# Patient Record
Sex: FEMALE | Race: ASIAN | NOT HISPANIC OR LATINO | Employment: FULL TIME | URBAN - METROPOLITAN AREA
[De-identification: names, ages, dates, MRNs, and addresses within clinical notes are randomized per-mention and may not be internally consistent; named-entity substitution may affect disease eponyms.]

---

## 2018-04-18 ENCOUNTER — OFFICE VISIT (OUTPATIENT)
Dept: URGENT CARE | Facility: URGENT CARE | Age: 23
End: 2018-04-18
Payer: COMMERCIAL

## 2018-04-18 VITALS
SYSTOLIC BLOOD PRESSURE: 106 MMHG | HEART RATE: 89 BPM | WEIGHT: 130.6 LBS | OXYGEN SATURATION: 99 % | TEMPERATURE: 98.1 F | BODY MASS INDEX: 21.9 KG/M2 | DIASTOLIC BLOOD PRESSURE: 58 MMHG

## 2018-04-18 DIAGNOSIS — N30.00 ACUTE CYSTITIS WITHOUT HEMATURIA: Primary | ICD-10-CM

## 2018-04-18 DIAGNOSIS — R82.90 NONSPECIFIC FINDING ON EXAMINATION OF URINE: ICD-10-CM

## 2018-04-18 DIAGNOSIS — R39.89 URINARY PROBLEM: ICD-10-CM

## 2018-04-18 LAB
ALBUMIN UR-MCNC: NEGATIVE MG/DL
APPEARANCE UR: CLEAR
BACTERIA #/AREA URNS HPF: ABNORMAL /HPF
BILIRUB UR QL STRIP: NEGATIVE
COLOR UR AUTO: YELLOW
GLUCOSE UR STRIP-MCNC: NEGATIVE MG/DL
HGB UR QL STRIP: ABNORMAL
KETONES UR STRIP-MCNC: NEGATIVE MG/DL
LEUKOCYTE ESTERASE UR QL STRIP: ABNORMAL
NITRATE UR QL: NEGATIVE
NON-SQ EPI CELLS #/AREA URNS LPF: ABNORMAL /LPF
PH UR STRIP: 5.5 PH (ref 5–7)
RBC #/AREA URNS AUTO: ABNORMAL /HPF
SOURCE: ABNORMAL
SP GR UR STRIP: 1.02 (ref 1–1.03)
UROBILINOGEN UR STRIP-ACNC: 0.2 EU/DL (ref 0.2–1)
WBC #/AREA URNS AUTO: >100 /HPF

## 2018-04-18 PROCEDURE — 99213 OFFICE O/P EST LOW 20 MIN: CPT | Performed by: FAMILY MEDICINE

## 2018-04-18 PROCEDURE — 87186 SC STD MICRODIL/AGAR DIL: CPT | Performed by: FAMILY MEDICINE

## 2018-04-18 PROCEDURE — 87086 URINE CULTURE/COLONY COUNT: CPT | Performed by: FAMILY MEDICINE

## 2018-04-18 PROCEDURE — 81001 URINALYSIS AUTO W/SCOPE: CPT | Performed by: FAMILY MEDICINE

## 2018-04-18 PROCEDURE — 87088 URINE BACTERIA CULTURE: CPT | Performed by: FAMILY MEDICINE

## 2018-04-18 RX ORDER — SULFAMETHOXAZOLE/TRIMETHOPRIM 800-160 MG
1 TABLET ORAL 2 TIMES DAILY
Qty: 6 TABLET | Refills: 0 | Status: SHIPPED | OUTPATIENT
Start: 2018-04-18 | End: 2018-04-21

## 2018-04-18 NOTE — PATIENT INSTRUCTIONS
follow up with your primary care provider if not better in 5-7 days.     Drink plenty of water    follow up sooner if any fevers/vomiting/nausea/kidney pain appears.

## 2018-04-18 NOTE — PROGRESS NOTES
SUBJECTIVE:   Andree Doyle is a 22 year old female who  presents today for a possible UTI. Symptoms of burning with urination and urinary frequency and malodorous urine have been going on for the past four days.  Hematuria no .  There is no history of fever, chills, nausea or vomiting.  No history of vaginal discharge.       Current Outpatient Prescriptions   Medication Sig Dispense Refill     Desogestrel-Ethinyl Estradiol (RECLIPSEN PO)        ibuprofen (ADVIL,MOTRIN) 600 MG tablet Take 1 tablet (600 mg) by mouth every 6 hours as needed for moderate pain 30 tablet 1     levonorgestrel (MIRENA) 20 MCG/24HR IUD 1 each by Intrauterine route once       MAGIC MOUTHWASH, ENTER INGREDIENTS IN COMMENTS, Swish and spit 5-10 mLs in mouth every 6 hours as needed Pharmacy please compound  30 ml of Benadryl (12.5 mg/5 ml), 60 ml Maalox and 30 ml Viscous Lidocaine 120 mL 0     Social History   Substance Use Topics     Smoking status: Current Every Day Smoker     Packs/day: 0.80     Types: Cigarettes     Smokeless tobacco: Never Used      Comment: .5-1 ppd     Alcohol use Not on file       ROS:   Review of systems negative except as stated above.    OBJECTIVE:  /58 (BP Location: Right arm, Patient Position: Sitting, Cuff Size: Adult Regular)  Pulse 89  Temp 98.1  F (36.7  C) (Tympanic)  Wt 130 lb 9.6 oz (59.2 kg)  SpO2 99%  BMI 21.9 kg/m2  GENERAL APPEARANCE: healthy, alert and no distress    UA:    Results for orders placed or performed in visit on 04/18/18   UA reflex to Microscopic and Culture   Result Value Ref Range    Color Urine Yellow     Appearance Urine Clear     Glucose Urine Negative NEG^Negative mg/dL    Bilirubin Urine Negative NEG^Negative    Ketones Urine Negative NEG^Negative mg/dL    Specific Gravity Urine 1.025 1.003 - 1.035    Blood Urine Trace (A) NEG^Negative    pH Urine 5.5 5.0 - 7.0 pH    Protein Albumin Urine Negative NEG^Negative mg/dL    Urobilinogen Urine 0.2 0.2 - 1.0 EU/dL    Nitrite  Urine Negative NEG^Negative    Leukocyte Esterase Urine Small (A) NEG^Negative    Source Midstream Urine    Urine Microscopic   Result Value Ref Range    WBC Urine >100 (A) OTO5^0 - 5 /HPF    RBC Urine O - 2 OTO2^O - 2 /HPF    Squamous Epithelial /LPF Urine Few FEW^Few /LPF    Bacteria Urine Many (A) NEG^Negative /HPF         ASSESSMENT:   Acute Cystitis without Hematuria    PLAN:  Rx:  Bactrim DS  As per ordered above  Drink plenty of fluids.    Signs and symptoms of pyelonephritis mentioned.  Follow up with primary care physician if not improving in 5-7 days. Sooner if any symptoms of pyelonephritis appear.     Kvng Hall MD

## 2018-04-18 NOTE — MR AVS SNAPSHOT
"              After Visit Summary   4/18/2018    Andree Doyle    MRN: [id number]           Patient Information     Date Of Birth          1995        Visit Information        Provider Department      4/18/2018 5:30 PM Kvng Hall MD [facility]        Today's Diagnoses     Acute cystitis without hematuria    -  1    Urinary problem        Nonspecific finding on examination of urine          Care Instructions    follow up with your primary care provider if not better in 5-7 days.     Drink plenty of water    follow up sooner if any fevers/vomiting/nausea/kidney pain appears.            Follow-ups after your visit        Who to contact     If you have questions or need follow up information about today's clinic visit or your schedule please contact [facility] directly at [phone].  Normal or non-critical lab and imaging results will be communicated to you by MyChart, letter or phone within 4 business days after the clinic has received the results. If you do not hear from us within 7 days, please contact the clinic through [employer] or phone. If you have a critical or abnormal lab result, we will notify you by phone as soon as possible.  Submit refill requests through [employer] or call your pharmacy and they will forward the refill request to us. Please allow 3 business days for your refill to be completed.          Additional Information About Your Visit        [employer] Information     [employer] lets you send messages to your doctor, view your test results, renew your prescriptions, schedule appointments and more. To sign up, go to [email or web address] . Click on \"Log in\" on the left side of the screen, which will take you to the Welcome page. Then click on \"Sign up Now\" on the right side of the page.     You will be asked to enter the access code listed below, as well as some personal information. Please follow the directions to create your username and password.     Your " access code is: [email or web address]  Expires: 2018  5:59 PM     Your access code will  in 90 days. If you need help or a new code, please call your [place] clinic or [phone].        Care EveryWhere ID     This is your Care EveryWhere ID. This could be used by other organizations to access your [place] medical records  [id number]        Your Vitals Were     Pulse Temperature Pulse Oximetry BMI (Body Mass Index)          89 98.1  F (36.7  C) (Tympanic) 99% 21.9 kg/m2         Blood Pressure from Last 3 Encounters:   18 106/58   02/24/15 116/70    Weight from Last 3 Encounters:   18 130 lb 9.6 oz (59.2 kg)   02/24/15 142 lb (64.4 kg) (73 %)*   14 131 lb (59.4 kg) (60 %)*     * Growth percentiles are based on [facility] 2-20 Years data.              We Performed the Following     UA reflex to Microscopic and Culture     Urine Culture Aerobic Bacterial     Urine Microscopic          Today's Medication Changes          These changes are accurate as of 18  5:59 PM.  If you have any questions, ask your nurse or doctor.               Start taking these medicines.        Dose/Directions    sulfamethoxazole-trimethoprim 800-160 MG per tablet   Commonly known as:  BACTRIM DS/SEPTRA DS   Used for:  Acute cystitis without hematuria   Started by:  Kvng Hall MD        Dose:  1 tablet   Take 1 tablet by mouth 2 times daily for 3 days   Quantity:  6 tablet   Refills:  0            Where to get your medicines      These medications were sent to [id number] - [place] - [address]  AT [facility]  [address] [place] [address]     Phone:  [phone]     sulfamethoxazole-trimethoprim 800-160 MG per tablet                Primary Care Provider Fax #    Physician No Ref-Primary [phone]       No address on file        Equal Access to Services     TONY VELASQUEZ AH: Yanely Rubio, roxie penn, lisa page  nia gradymohan omidlois lamervinmiya ah. So [facility] [phone].    ATENCIÓN: Si oziella olivia, tiene a bermudez disposición servicios gratuitos de asistencia lingüística. Kendra ball [phone].    We comply with applicable federal civil rights laws and [place] laws. We do not discriminate on the basis of race, color, national origin, age, disability, sex, sexual orientation, or gender identity.            Thank you!     Thank you for choosing [facility]  for your care. Our goal is always to provide you with excellent care. Hearing back from our patients is one way we can continue to improve our services. Please take a few minutes to complete the written survey that you may receive in the mail after your visit with us. Thank you!             Your Updated Medication List - Protect others around you: Learn how to safely use, store and throw away your medicines at www.disposemymeds.org.          This list is accurate as of 4/18/18  5:59 PM.  Always use your most recent med list.                   Brand Name Dispense Instructions for use Diagnosis    ibuprofen 600 MG tablet    ADVIL/MOTRIN    30 tablet    Take 1 tablet (600 mg) by mouth every 6 hours as needed for moderate pain    Acute pharyngitis, Throat swelling       levonorgestrel 20 MCG/24HR IUD    MIRENA     1 each by Intrauterine route once        magic mouthwash suspension    ENTER INGREDIENTS IN COMMENTS    120 mL    Swish and spit 5-10 mLs in mouth every 6 hours as needed Pharmacy please compound  30 ml of Benadryl (12.5 mg/5 ml), 60 ml Maalox and 30 ml Viscous Lidocaine    Acute pharyngitis       RECLIPSEN PO           sulfamethoxazole-trimethoprim 800-160 MG per tablet    BACTRIM DS/SEPTRA DS    6 tablet    Take 1 tablet by mouth 2 times daily for 3 days    Acute cystitis without hematuria

## 2018-04-20 ENCOUNTER — NURSE TRIAGE (OUTPATIENT)
Dept: NURSING | Facility: CLINIC | Age: 23
End: 2018-04-20

## 2018-04-20 LAB
BACTERIA SPEC CULT: ABNORMAL
SPECIMEN SOURCE: ABNORMAL

## 2018-04-20 RX ORDER — CIPROFLOXACIN 500 MG/1
500 TABLET, FILM COATED ORAL 2 TIMES DAILY
Qty: 6 TABLET | Refills: 0 | Status: ON HOLD | OUTPATIENT
Start: 2018-04-20 | End: 2023-11-20

## 2018-04-20 NOTE — TELEPHONE ENCOUNTER
"Seen at  [facility] and Dx with UTI on 4/18/18  . Got call from pharmacy that new antibiotic is and Rx ready at [facility] and suspect it from culture results .   No fever but pushing fluids and wants to know if she should stop Sulfa from 4/18/18 and just start Cipro today . Conferenced to [facility] staff and  Kalyani TOMAS  States \" yes, stop sulfa and start Cipro today .\"   .Nayeli Fink RN [facility] nurse advisors.    "

## 2020-10-04 ENCOUNTER — OFFICE VISIT (OUTPATIENT)
Dept: URGENT CARE | Facility: URGENT CARE | Age: 25
End: 2020-10-04
Payer: COMMERCIAL

## 2020-10-04 VITALS
HEART RATE: 90 BPM | SYSTOLIC BLOOD PRESSURE: 110 MMHG | OXYGEN SATURATION: 100 % | TEMPERATURE: 98.4 F | BODY MASS INDEX: 19.79 KG/M2 | WEIGHT: 118 LBS | DIASTOLIC BLOOD PRESSURE: 74 MMHG
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
## 2020-10-04 DIAGNOSIS — L03.114 CELLULITIS OF LEFT UPPER EXTREMITY: Primary | ICD-10-CM

## 2020-10-04 PROCEDURE — 99213 OFFICE O/P EST LOW 20 MIN: CPT | Performed by: FAMILY MEDICINE

## 2020-10-04 RX ORDER — CEPHALEXIN 500 MG/1
500 CAPSULE ORAL 2 TIMES DAILY
Qty: 14 CAPSULE | Refills: 0 | Status: SHIPPED | OUTPATIENT
Start: 2020-10-04 | End: 2020-10-11

## 2020-10-04 RX ORDER — CETIRIZINE HYDROCHLORIDE 10 MG/1
10 TABLET ORAL DAILY
COMMUNITY

## 2020-10-04 NOTE — PROGRESS NOTES
SUBJECTIVE:  Andree Doyle is a 25 year old female who was cleaning in her basement wearing gloves note tenderness and swelling mp joint index finger and thenar emenince lt hand with slight swelling. No redness.  Associated symptoms include: nothing.    No past medical history on file.  Current Outpatient Medications   Medication Sig Dispense Refill     cephALEXin (KEFLEX) 500 MG capsule Take 1 capsule (500 mg) by mouth 2 times daily for 7 days 14 capsule 0     cetirizine (ZYRTEC) 10 MG tablet Take 10 mg by mouth daily       levonorgestrel (MIRENA) 20 MCG/24HR IUD 1 each by Intrauterine route once       ciprofloxacin (CIPRO) 500 MG tablet Take 1 tablet (500 mg) by mouth 2 times daily (Patient not taking: Reported on 10/4/2020) 6 tablet 0     Desogestrel-Ethinyl Estradiol (RECLIPSEN PO)        ibuprofen (ADVIL,MOTRIN) 600 MG tablet Take 1 tablet (600 mg) by mouth every 6 hours as needed for moderate pain (Patient not taking: Reported on 10/4/2020) 30 tablet 1     MAGIC MOUTHWASH, ENTER INGREDIENTS IN COMMENTS, Swish and spit 5-10 mLs in mouth every 6 hours as needed Pharmacy please compound  30 ml of Benadryl (12.5 mg/5 ml), 60 ml Maalox and 30 ml Viscous Lidocaine (Patient not taking: Reported on 10/4/2020) 120 mL 0     History   Smoking Status     Current Every Day Smoker     Packs/day: 0.80     Types: Cigarettes   Smokeless Tobacco     Never Used     Comment: .5-1 ppd       ROS:  Review of systems negative except as stated above.    EXAM:   /74   Pulse 90   Temp 98.4  F (36.9  C) (Temporal)   Wt 53.5 kg (118 lb)   SpO2 100%   Breastfeeding No   BMI 19.79 kg/m    GENERAL: alert, no acute distress.  SKIN: Rash description:    Distribution: localized  Location: slight swelling lt thenar eminence and mp joint index. Small punctate redness near wrist.  No swelling at wrist or redness      ASSESSMENT:  Bug bite    PLAN:  zytec for local allergic reaction.  Printed rx for cephalexin to fill only if  increased swelling, redness and streaking.  Follow up with primary care provider if no improvement.

## 2021-04-10 ENCOUNTER — HEALTH MAINTENANCE LETTER (OUTPATIENT)
Age: 26
End: 2021-04-10

## 2021-09-19 ENCOUNTER — HEALTH MAINTENANCE LETTER (OUTPATIENT)
Age: 26
End: 2021-09-19

## 2022-05-01 ENCOUNTER — HEALTH MAINTENANCE LETTER (OUTPATIENT)
Age: 27
End: 2022-05-01

## 2022-11-21 ENCOUNTER — HEALTH MAINTENANCE LETTER (OUTPATIENT)
Age: 27
End: 2022-11-21

## 2023-05-19 ENCOUNTER — TRANSFERRED RECORDS (OUTPATIENT)
Dept: HEALTH INFORMATION MANAGEMENT | Facility: CLINIC | Age: 28
End: 2023-05-19

## 2023-06-02 ENCOUNTER — HEALTH MAINTENANCE LETTER (OUTPATIENT)
Age: 28
End: 2023-06-02

## 2023-11-17 ENCOUNTER — TRANSFERRED RECORDS (OUTPATIENT)
Dept: HEALTH INFORMATION MANAGEMENT | Facility: CLINIC | Age: 28
End: 2023-11-17
Payer: COMMERCIAL

## 2023-11-20 ENCOUNTER — HOSPITAL ENCOUNTER (INPATIENT)
Facility: CLINIC | Age: 28
LOS: 3 days | Discharge: HOME | End: 2023-11-23
Attending: STUDENT IN AN ORGANIZED HEALTH CARE EDUCATION/TRAINING PROGRAM | Admitting: STUDENT IN AN ORGANIZED HEALTH CARE EDUCATION/TRAINING PROGRAM
Payer: COMMERCIAL

## 2023-11-20 DIAGNOSIS — U07.1 COVID: ICD-10-CM

## 2023-11-20 PROBLEM — O13.9 GESTATIONAL HYPERTENSION: Status: ACTIVE | Noted: 2023-11-20

## 2023-11-20 LAB
ABO/RH(D): NORMAL
ALBUMIN MFR UR ELPH: 14.6 MG/DL
ALBUMIN SERPL BCG-MCNC: 3.5 G/DL (ref 3.5–5.2)
ALP SERPL-CCNC: 156 U/L (ref 40–150)
ALT SERPL W P-5'-P-CCNC: 10 U/L (ref 0–50)
ANION GAP SERPL CALCULATED.3IONS-SCNC: 13 MMOL/L (ref 7–15)
ANTIBODY SCREEN: NEGATIVE
AST SERPL W P-5'-P-CCNC: 13 U/L (ref 0–45)
BILIRUB SERPL-MCNC: 0.2 MG/DL
BUN SERPL-MCNC: 6 MG/DL (ref 6–20)
CALCIUM SERPL-MCNC: 8.9 MG/DL (ref 8.6–10)
CHLORIDE SERPL-SCNC: 104 MMOL/L (ref 98–107)
CREAT SERPL-MCNC: 0.4 MG/DL (ref 0.51–0.95)
CREAT UR-MCNC: 90.5 MG/DL
DEPRECATED HCO3 PLAS-SCNC: 18 MMOL/L (ref 22–29)
EGFRCR SERPLBLD CKD-EPI 2021: >90 ML/MIN/1.73M2
ERYTHROCYTE [DISTWIDTH] IN BLOOD BY AUTOMATED COUNT: 13.8 % (ref 10–15)
GLUCOSE SERPL-MCNC: 106 MG/DL (ref 70–99)
HCT VFR BLD AUTO: 33.5 % (ref 35–47)
HGB BLD-MCNC: 10.7 G/DL (ref 11.7–15.7)
MCH RBC QN AUTO: 24.9 PG (ref 26.5–33)
MCHC RBC AUTO-ENTMCNC: 31.9 G/DL (ref 31.5–36.5)
MCV RBC AUTO: 78 FL (ref 78–100)
PLATELET # BLD AUTO: 244 10E3/UL (ref 150–450)
POTASSIUM SERPL-SCNC: 4 MMOL/L (ref 3.4–5.3)
PROT SERPL-MCNC: 6.4 G/DL (ref 6.4–8.3)
PROT/CREAT 24H UR: 0.16 MG/MG CR (ref 0–0.2)
RBC # BLD AUTO: 4.29 10E6/UL (ref 3.8–5.2)
SODIUM SERPL-SCNC: 135 MMOL/L (ref 135–145)
SPECIMEN EXPIRATION DATE: NORMAL
T PALLIDUM AB SER QL: NONREACTIVE
WBC # BLD AUTO: 9.8 10E3/UL (ref 4–11)

## 2023-11-20 PROCEDURE — 86780 TREPONEMA PALLIDUM: CPT | Performed by: STUDENT IN AN ORGANIZED HEALTH CARE EDUCATION/TRAINING PROGRAM

## 2023-11-20 PROCEDURE — 85048 AUTOMATED LEUKOCYTE COUNT: CPT | Performed by: STUDENT IN AN ORGANIZED HEALTH CARE EDUCATION/TRAINING PROGRAM

## 2023-11-20 PROCEDURE — 84156 ASSAY OF PROTEIN URINE: CPT | Performed by: STUDENT IN AN ORGANIZED HEALTH CARE EDUCATION/TRAINING PROGRAM

## 2023-11-20 PROCEDURE — 250N000013 HC RX MED GY IP 250 OP 250 PS 637: Performed by: STUDENT IN AN ORGANIZED HEALTH CARE EDUCATION/TRAINING PROGRAM

## 2023-11-20 PROCEDURE — 80053 COMPREHEN METABOLIC PANEL: CPT | Performed by: STUDENT IN AN ORGANIZED HEALTH CARE EDUCATION/TRAINING PROGRAM

## 2023-11-20 PROCEDURE — 36415 COLL VENOUS BLD VENIPUNCTURE: CPT | Performed by: STUDENT IN AN ORGANIZED HEALTH CARE EDUCATION/TRAINING PROGRAM

## 2023-11-20 PROCEDURE — 86901 BLOOD TYPING SEROLOGIC RH(D): CPT | Performed by: STUDENT IN AN ORGANIZED HEALTH CARE EDUCATION/TRAINING PROGRAM

## 2023-11-20 PROCEDURE — 120N000001 HC R&B MED SURG/OB

## 2023-11-20 RX ORDER — FENTANYL CITRATE 50 UG/ML
50 INJECTION, SOLUTION INTRAMUSCULAR; INTRAVENOUS EVERY 30 MIN PRN
Status: DISCONTINUED | OUTPATIENT
Start: 2023-11-20 | End: 2023-11-22 | Stop reason: HOSPADM

## 2023-11-20 RX ORDER — NALOXONE HYDROCHLORIDE 0.4 MG/ML
0.2 INJECTION, SOLUTION INTRAMUSCULAR; INTRAVENOUS; SUBCUTANEOUS
Status: DISCONTINUED | OUTPATIENT
Start: 2023-11-20 | End: 2023-11-22 | Stop reason: HOSPADM

## 2023-11-20 RX ORDER — MISOPROSTOL 200 UG/1
400 TABLET ORAL
Status: DISCONTINUED | OUTPATIENT
Start: 2023-11-20 | End: 2023-11-22 | Stop reason: HOSPADM

## 2023-11-20 RX ORDER — METOCLOPRAMIDE HYDROCHLORIDE 5 MG/ML
10 INJECTION INTRAMUSCULAR; INTRAVENOUS EVERY 6 HOURS PRN
Status: DISCONTINUED | OUTPATIENT
Start: 2023-11-20 | End: 2023-11-22 | Stop reason: HOSPADM

## 2023-11-20 RX ORDER — MISOPROSTOL 100 UG/1
25 TABLET ORAL
Status: DISCONTINUED | OUTPATIENT
Start: 2023-11-20 | End: 2023-11-22 | Stop reason: HOSPADM

## 2023-11-20 RX ORDER — TERBUTALINE SULFATE 1 MG/ML
0.25 INJECTION, SOLUTION SUBCUTANEOUS
Status: DISCONTINUED | OUTPATIENT
Start: 2023-11-20 | End: 2023-11-22 | Stop reason: HOSPADM

## 2023-11-20 RX ORDER — OXYTOCIN/0.9 % SODIUM CHLORIDE 30/500 ML
340 PLASTIC BAG, INJECTION (ML) INTRAVENOUS CONTINUOUS PRN
Status: COMPLETED | OUTPATIENT
Start: 2023-11-20 | End: 2023-11-21

## 2023-11-20 RX ORDER — METHYLERGONOVINE MALEATE 0.2 MG/ML
200 INJECTION INTRAVENOUS
Status: DISCONTINUED | OUTPATIENT
Start: 2023-11-20 | End: 2023-11-22 | Stop reason: HOSPADM

## 2023-11-20 RX ORDER — KETOROLAC TROMETHAMINE 30 MG/ML
30 INJECTION, SOLUTION INTRAMUSCULAR; INTRAVENOUS
Status: CANCELLED | OUTPATIENT
Start: 2023-11-20 | End: 2023-11-25

## 2023-11-20 RX ORDER — PROCHLORPERAZINE 25 MG
25 SUPPOSITORY, RECTAL RECTAL EVERY 12 HOURS PRN
Status: DISCONTINUED | OUTPATIENT
Start: 2023-11-20 | End: 2023-11-22 | Stop reason: HOSPADM

## 2023-11-20 RX ORDER — LORATADINE 10 MG/1
10 TABLET ORAL DAILY
COMMUNITY

## 2023-11-20 RX ORDER — METOCLOPRAMIDE 10 MG/1
10 TABLET ORAL EVERY 6 HOURS PRN
Status: DISCONTINUED | OUTPATIENT
Start: 2023-11-20 | End: 2023-11-22 | Stop reason: HOSPADM

## 2023-11-20 RX ORDER — OXYTOCIN/0.9 % SODIUM CHLORIDE 30/500 ML
100-340 PLASTIC BAG, INJECTION (ML) INTRAVENOUS CONTINUOUS PRN
Status: CANCELLED | OUTPATIENT
Start: 2023-11-20

## 2023-11-20 RX ORDER — VITAMIN A ACETATE, .BETA.-CAROTENE, ASCORBIC ACID, CHOLECALCIFEROL, .ALPHA.-TOCOPHEROL ACETATE, DL-, THIAMINE MONONITRATE, RIBOFLAVIN, NIACINAMIDE, PYRIDOXINE HYDROCHLORIDE, FOLIC ACID, CYANOCOBALAMIN, CALCIUM CARBONATE, FERROUS FUMARATE, ZINC OXIDE, AND CUPRIC OXIDE 2000; 2000; 120; 400; 22; 1.84; 3; 20; 10; 1; 12; 200; 27; 25; 2 [IU]/1; [IU]/1; MG/1; [IU]/1; MG/1; MG/1; MG/1; MG/1; MG/1; MG/1; UG/1; MG/1; MG/1; MG/1; MG/1
1 TABLET ORAL DAILY
COMMUNITY

## 2023-11-20 RX ORDER — OXYTOCIN 10 [USP'U]/ML
10 INJECTION, SOLUTION INTRAMUSCULAR; INTRAVENOUS
Status: CANCELLED | OUTPATIENT
Start: 2023-11-20

## 2023-11-20 RX ORDER — ONDANSETRON 2 MG/ML
4 INJECTION INTRAMUSCULAR; INTRAVENOUS EVERY 6 HOURS PRN
Status: DISCONTINUED | OUTPATIENT
Start: 2023-11-20 | End: 2023-11-22 | Stop reason: HOSPADM

## 2023-11-20 RX ORDER — IBUPROFEN 400 MG/1
800 TABLET, FILM COATED ORAL
Status: CANCELLED | OUTPATIENT
Start: 2023-11-20 | End: 2023-11-25

## 2023-11-20 RX ORDER — NALOXONE HYDROCHLORIDE 0.4 MG/ML
0.4 INJECTION, SOLUTION INTRAMUSCULAR; INTRAVENOUS; SUBCUTANEOUS
Status: DISCONTINUED | OUTPATIENT
Start: 2023-11-20 | End: 2023-11-22 | Stop reason: HOSPADM

## 2023-11-20 RX ORDER — ACETAMINOPHEN 325 MG/1
650 TABLET ORAL EVERY 4 HOURS PRN
Status: DISCONTINUED | OUTPATIENT
Start: 2023-11-20 | End: 2023-11-22 | Stop reason: HOSPADM

## 2023-11-20 RX ORDER — OXYTOCIN 10 [USP'U]/ML
10 INJECTION, SOLUTION INTRAMUSCULAR; INTRAVENOUS
Status: DISCONTINUED | OUTPATIENT
Start: 2023-11-20 | End: 2023-11-22 | Stop reason: HOSPADM

## 2023-11-20 RX ORDER — TRANEXAMIC ACID 10 MG/ML
1 INJECTION, SOLUTION INTRAVENOUS EVERY 30 MIN PRN
Status: DISCONTINUED | OUTPATIENT
Start: 2023-11-20 | End: 2023-11-22 | Stop reason: HOSPADM

## 2023-11-20 RX ORDER — CITRIC ACID/SODIUM CITRATE 334-500MG
30 SOLUTION, ORAL ORAL
Status: DISCONTINUED | OUTPATIENT
Start: 2023-11-20 | End: 2023-11-22 | Stop reason: HOSPADM

## 2023-11-20 RX ORDER — PROCHLORPERAZINE MALEATE 5 MG
10 TABLET ORAL EVERY 6 HOURS PRN
Status: DISCONTINUED | OUTPATIENT
Start: 2023-11-20 | End: 2023-11-22 | Stop reason: HOSPADM

## 2023-11-20 RX ORDER — CARBOPROST TROMETHAMINE 250 UG/ML
250 INJECTION, SOLUTION INTRAMUSCULAR
Status: DISCONTINUED | OUTPATIENT
Start: 2023-11-20 | End: 2023-11-22 | Stop reason: HOSPADM

## 2023-11-20 RX ORDER — ONDANSETRON 4 MG/1
4 TABLET, ORALLY DISINTEGRATING ORAL EVERY 6 HOURS PRN
Status: DISCONTINUED | OUTPATIENT
Start: 2023-11-20 | End: 2023-11-22 | Stop reason: HOSPADM

## 2023-11-20 RX ORDER — HYDROXYZINE HYDROCHLORIDE 25 MG/1
50 TABLET, FILM COATED ORAL
Status: DISCONTINUED | OUTPATIENT
Start: 2023-11-20 | End: 2023-11-22 | Stop reason: HOSPADM

## 2023-11-20 RX ORDER — MISOPROSTOL 200 UG/1
800 TABLET ORAL
Status: DISCONTINUED | OUTPATIENT
Start: 2023-11-20 | End: 2023-11-22 | Stop reason: HOSPADM

## 2023-11-20 RX ADMIN — MISOPROSTOL 25 MCG: 100 TABLET ORAL at 21:29

## 2023-11-20 RX ADMIN — MISOPROSTOL 25 MCG: 100 TABLET ORAL at 13:17

## 2023-11-20 RX ADMIN — MISOPROSTOL 25 MCG: 100 TABLET ORAL at 17:34

## 2023-11-20 RX ADMIN — MISOPROSTOL 25 MCG: 100 TABLET ORAL at 15:25

## 2023-11-20 RX ADMIN — MISOPROSTOL 25 MCG: 100 TABLET ORAL at 19:32

## 2023-11-20 RX ADMIN — MISOPROSTOL 25 MCG: 100 TABLET ORAL at 23:31

## 2023-11-20 ASSESSMENT — ACTIVITIES OF DAILY LIVING (ADL)
ADLS_ACUITY_SCORE: 20

## 2023-11-20 NOTE — PROGRESS NOTES
Data: Patient admitted to room 214 at 1210. Patient is a . Prenatal record reviewed.   OB History    Para Term  AB Living   1 0 0 0 0 0   SAB IAB Ectopic Multiple Live Births   0 0 0 0 0      # Outcome Date GA Lbr Jet/2nd Weight Sex Delivery Anes PTL Lv   1 Current            .  Medical History:   Past Medical History:   Diagnosis Date    Hypertension    .  Gestational age 37w0d. Vital signs per doc flowsheet. Fetal movement present. Patient reports Induction Of Labor (For high blood pressure)   as reason for admission. Support persons Javan present.  Action: Patient arrived from clinic. Care of patient assumed at 1210. Verbal consent for EFM, external fetal monitors applied. Admission assessment completed. Patient and support persons educated on labor process. Patient instructed to report change in fetal movement, contractions, vaginal leaking of fluid or bleeding, abdominal pain, or any concerns related to the pregnancy to her nurse/physician. Patient oriented to room, call light in reach.   Response: Dr. Walters informed of arrival to L&D for induction of labor. Plan per provider is oral cytotec. Patient verbalized understanding of education and verbalized agreement with plan. Patient coping with labor.

## 2023-11-20 NOTE — H&P
OB HISTORY AND PHYSICAL    Patient Name: Andree Ramos   Admit Date: 1120  MR #: [id number]   Acct #: [id number]   : 1995     Chief Complaint/Reason for Visit: gestational hypertensoin    History of Present Illness: Andree Ramos is a 28 year old  at 37w0d here for induction of labor due to gestational hypertension. She had new onset elevated blood pressure last Friday. PIH labs wnl. Today on repeat BP mild range BP. Pt continues to be asymptomatic. Denies HA, vision changes, CP, SOB, RUQ pain. +FM, denies ctx, vb, lof.    Andree Avila's prenatal care is notable for low risk NIPS (XY, pt aware), Carrier screen negative, AFP neg, anatomy US wnl. 1hr gtt wnl. GBS unknown (collected at 23).     Review of Systems:  General: denies fevers  CV: denies CP  Pulm: denies SOB  Neuro: denies HA  Abd: denies N/V  Gyn: denies vaginal discharge  Skin: denies rashes  MSK: denies calf pain  Psych: denies depression     History:   OB History    Para Term  AB Living   1 0 0 0 0 0   SAB IAB Ectopic Multiple Live Births   0 0 0 0 0      # Outcome Date GA Lbr Jet/2nd Weight Sex Delivery Anes PTL Lv   1 Current                  Past Medical History:   Diagnosis Date     Hypertension        History reviewed. No pertinent surgical history.    History reviewed. No pertinent family history.    Social History     Socioeconomic History     Marital status:      Spouse name: Not on file     Number of children: Not on file     Years of education: Not on file     Highest education level: Not on file   Occupational History     Not on file   Tobacco Use     Smoking status: Former     Packs/day: .8     Types: Cigarettes     Quit date: 2023     Years since quittin.6     Smokeless tobacco: Never     Tobacco comments:     .5-1 ppd   Substance and Sexual Activity     Alcohol use: Never     Drug use: Never     Sexual activity: Yes     Partners: Male   Other Topics Concern     Not on file   Social History  Narrative     Not on file     Social Determinants of Health     Financial Resource Strain: Not on file   Food Insecurity: Not on file   Transportation Needs: Not on file   Physical Activity: Not on file   Stress: Not on file   Social Connections: Not on file   Interpersonal Safety: Not on file   Housing Stability: Not on file       Allergy Information:        I have reviewed the patient's allergies.      @    Home Medications:   No current outpatient medications on file.       Physical Examination:  Vitals:  Temp:  [97.6  F (36.4  C)] 97.6  F (36.4  C)  Resp:  [16] 16  BP: (124-139)/(79-80) 124/79  SpO2:  [99 %] 99 %    Exam:  General: no acute distress  Head: normocephalic, atraumatic  Eyes: EOMI  CV: pulses intact  Pulm: no increased effort  Neuro: CN II-XII grossly intact  Abd: gravid, soft, NTTP  Gyn: 1.5/70/-2  Skin: no rashes  MSK: no calf tenderness  Psych: AOx3, appropriate mood/affect    Fetus: FHT: 140, moderate variability, positive accels, no decels; Graham: wuiet.    Laboratory and Additional Data Reviewed:  Any associated labs, path, imaging personally reviewed  Results for orders placed or performed during the hospital encounter of 11/20/23   CBC with platelets     Status: Abnormal   Result Value Ref Range    WBC Count 9.8 4.0 - 11.0 10e3/uL    RBC Count 4.29 3.80 - 5.20 10e6/uL    Hemoglobin 10.7 (L) 11.7 - 15.7 g/dL    Hematocrit 33.5 (L) 35.0 - 47.0 %    MCV 78 78 - 100 fL    MCH 24.9 (L) 26.5 - 33.0 pg    MCHC 31.9 31.5 - 36.5 g/dL    RDW 13.8 10.0 - 15.0 %    Platelet Count 244 150 - 450 10e3/uL   Comprehensive metabolic panel     Status: Abnormal   Result Value Ref Range    Sodium 135 135 - 145 mmol/L    Potassium 4.0 3.4 - 5.3 mmol/L    Carbon Dioxide (CO2) 18 (L) 22 - 29 mmol/L    Anion Gap 13 7 - 15 mmol/L    Urea Nitrogen 6.0 6.0 - 20.0 mg/dL    Creatinine 0.40 (L) 0.51 - 0.95 mg/dL    GFR Estimate >90 >60 mL/min/1.73m2    Calcium 8.9 8.6 - 10.0 mg/dL    Chloride 104 98 - 107 mmol/L    Glucose  106 (H) 70 - 99 mg/dL    Alkaline Phosphatase 156 (H) 40 - 150 U/L    AST 13 0 - 45 U/L    ALT 10 0 - 50 U/L    Protein Total 6.4 6.4 - 8.3 g/dL    Albumin 3.5 3.5 - 5.2 g/dL    Bilirubin Total 0.2 <=1.2 mg/dL   Protein  random urine     Status: None   Result Value Ref Range    Total Protein Urine mg/dL 14.6   mg/dL    Total Protein Urine mg/mg Creat 0.16 0.00 - 0.20 mg/mg Cr    Creatinine Urine mg/dL 90.5 mg/dL   Adult Type and Screen     Status: None   Result Value Ref Range    ABO/RH(D) A POS     Antibody Screen Negative Negative    SPECIMEN EXPIRATION DATE [id number]    ABO/Rh type and screen     Status: None    Narrative    The following orders were created for panel order ABO/Rh type and screen.  Procedure                               Abnormality         Status                     ---------                               -----------         ------                     Adult Type and Screen[[id number]]                            Final result                 Please view results for these tests on the individual orders.         Assessment and Plan: Andree Ramos is a 28 year old  at 37w0d here for cervical ripening and induction of labor due to gestational hypertension    IOL  - cervical ripening via PO cytotec  - plan to transition to oxytocin and AROM when cervix favorable  - pain mgmt per pt request  - GBS unknown, however pt is term so ppx not indicated unless ROM >18 hours (Update on 23: GBS swab resulted and positive, abx ppx ordered)  - anticipate vaginal delivery    GHTN  - PIH labs wnl on admission, P:C wnl  - repeat labs if severe range BP or pt develops sx.    Dispo: admit to L&D for IOL, Dr. Barron to follow over night    MD Valente [facility], [facility]  2023, 4:32 PM

## 2023-11-21 ENCOUNTER — ANESTHESIA EVENT (OUTPATIENT)
Dept: OBGYN | Facility: CLINIC | Age: 28
End: 2023-11-21
Payer: COMMERCIAL

## 2023-11-21 ENCOUNTER — ANESTHESIA (OUTPATIENT)
Dept: OBGYN | Facility: CLINIC | Age: 28
End: 2023-11-21
Payer: COMMERCIAL

## 2023-11-21 LAB
C PNEUM DNA SPEC QL NAA+PROBE: NOT DETECTED
ERYTHROCYTE [DISTWIDTH] IN BLOOD BY AUTOMATED COUNT: 13.9 % (ref 10–15)
FLUAV H1 2009 PAND RNA SPEC QL NAA+PROBE: NOT DETECTED
FLUAV H1 RNA SPEC QL NAA+PROBE: NOT DETECTED
FLUAV H3 RNA SPEC QL NAA+PROBE: NOT DETECTED
FLUAV RNA SPEC QL NAA+PROBE: NEGATIVE
FLUAV RNA SPEC QL NAA+PROBE: NOT DETECTED
FLUBV RNA RESP QL NAA+PROBE: NEGATIVE
FLUBV RNA SPEC QL NAA+PROBE: NOT DETECTED
HADV DNA SPEC QL NAA+PROBE: NOT DETECTED
HCOV PNL SPEC NAA+PROBE: NOT DETECTED
HCT VFR BLD AUTO: 28.4 % (ref 35–47)
HGB BLD-MCNC: 9.1 G/DL (ref 11.7–15.7)
HMPV RNA SPEC QL NAA+PROBE: NOT DETECTED
HPIV1 RNA SPEC QL NAA+PROBE: NOT DETECTED
HPIV2 RNA SPEC QL NAA+PROBE: NOT DETECTED
HPIV3 RNA SPEC QL NAA+PROBE: NOT DETECTED
HPIV4 RNA SPEC QL NAA+PROBE: NOT DETECTED
M PNEUMO DNA SPEC QL NAA+PROBE: NOT DETECTED
MCH RBC QN AUTO: 25.2 PG (ref 26.5–33)
MCHC RBC AUTO-ENTMCNC: 32 G/DL (ref 31.5–36.5)
MCV RBC AUTO: 79 FL (ref 78–100)
PLATELET # BLD AUTO: 177 10E3/UL (ref 150–450)
RBC # BLD AUTO: 3.61 10E6/UL (ref 3.8–5.2)
RSV RNA SPEC NAA+PROBE: NEGATIVE
RSV RNA SPEC QL NAA+PROBE: NOT DETECTED
RSV RNA SPEC QL NAA+PROBE: NOT DETECTED
RV+EV RNA SPEC QL NAA+PROBE: NOT DETECTED
SARS-COV-2 RNA RESP QL NAA+PROBE: POSITIVE
WBC # BLD AUTO: 8.7 10E3/UL (ref 4–11)

## 2023-11-21 PROCEDURE — 250N000011 HC RX IP 250 OP 636: Performed by: STUDENT IN AN ORGANIZED HEALTH CARE EDUCATION/TRAINING PROGRAM

## 2023-11-21 PROCEDURE — 250N000009 HC RX 250: Performed by: STUDENT IN AN ORGANIZED HEALTH CARE EDUCATION/TRAINING PROGRAM

## 2023-11-21 PROCEDURE — 87637 SARSCOV2&INF A&B&RSV AMP PRB: CPT | Performed by: STUDENT IN AN ORGANIZED HEALTH CARE EDUCATION/TRAINING PROGRAM

## 2023-11-21 PROCEDURE — 258N000003 HC RX IP 258 OP 636: Performed by: ANESTHESIOLOGY

## 2023-11-21 PROCEDURE — 250N000009 HC RX 250: Performed by: ANESTHESIOLOGY

## 2023-11-21 PROCEDURE — 370N000003 HC ANESTHESIA WARD SERVICE: Performed by: ANESTHESIOLOGY

## 2023-11-21 PROCEDURE — 00HU33Z INSERTION OF INFUSION DEVICE INTO SPINAL CANAL, PERCUTANEOUS APPROACH: ICD-10-PCS | Performed by: ANESTHESIOLOGY

## 2023-11-21 PROCEDURE — 722N000001 HC LABOR CARE VAGINAL DELIVERY SINGLE

## 2023-11-21 PROCEDURE — 250N000011 HC RX IP 250 OP 636: Performed by: ANESTHESIOLOGY

## 2023-11-21 PROCEDURE — 250N000013 HC RX MED GY IP 250 OP 250 PS 637: Performed by: STUDENT IN AN ORGANIZED HEALTH CARE EDUCATION/TRAINING PROGRAM

## 2023-11-21 PROCEDURE — 722N000002 HC LABOR CARE VAGINAL DELIVERY MULT

## 2023-11-21 PROCEDURE — 250N000011 HC RX IP 250 OP 636: Mod: JZ | Performed by: ANESTHESIOLOGY

## 2023-11-21 PROCEDURE — 10907ZC DRAINAGE OF AMNIOTIC FLUID, THERAPEUTIC FROM PRODUCTS OF CONCEPTION, VIA NATURAL OR ARTIFICIAL OPENING: ICD-10-PCS | Performed by: STUDENT IN AN ORGANIZED HEALTH CARE EDUCATION/TRAINING PROGRAM

## 2023-11-21 PROCEDURE — 85041 AUTOMATED RBC COUNT: CPT | Performed by: STUDENT IN AN ORGANIZED HEALTH CARE EDUCATION/TRAINING PROGRAM

## 2023-11-21 PROCEDURE — 59414 DELIVER PLACENTA: CPT

## 2023-11-21 PROCEDURE — 258N000003 HC RX IP 258 OP 636: Performed by: STUDENT IN AN ORGANIZED HEALTH CARE EDUCATION/TRAINING PROGRAM

## 2023-11-21 PROCEDURE — 3E0R3BZ INTRODUCTION OF ANESTHETIC AGENT INTO SPINAL CANAL, PERCUTANEOUS APPROACH: ICD-10-PCS | Performed by: ANESTHESIOLOGY

## 2023-11-21 PROCEDURE — 87581 M.PNEUMON DNA AMP PROBE: CPT | Performed by: STUDENT IN AN ORGANIZED HEALTH CARE EDUCATION/TRAINING PROGRAM

## 2023-11-21 PROCEDURE — 120N000001 HC R&B MED SURG/OB

## 2023-11-21 PROCEDURE — 36415 COLL VENOUS BLD VENIPUNCTURE: CPT | Performed by: STUDENT IN AN ORGANIZED HEALTH CARE EDUCATION/TRAINING PROGRAM

## 2023-11-21 RX ORDER — FENTANYL CITRATE 50 UG/ML
100 INJECTION, SOLUTION INTRAMUSCULAR; INTRAVENOUS ONCE
Status: DISCONTINUED | OUTPATIENT
Start: 2023-11-21 | End: 2023-11-22 | Stop reason: HOSPADM

## 2023-11-21 RX ORDER — TERBUTALINE SULFATE 1 MG/ML
0.25 INJECTION, SOLUTION SUBCUTANEOUS
Status: DISCONTINUED | OUTPATIENT
Start: 2023-11-21 | End: 2023-11-22 | Stop reason: HOSPADM

## 2023-11-21 RX ORDER — PENICILLIN G POTASSIUM 5000000 [IU]/1
5 INJECTION, POWDER, FOR SOLUTION INTRAMUSCULAR; INTRAVENOUS ONCE
Status: COMPLETED | OUTPATIENT
Start: 2023-11-21 | End: 2023-11-21

## 2023-11-21 RX ORDER — PENICILLIN G 3000000 [IU]/50ML
3 INJECTION, SOLUTION INTRAVENOUS EVERY 4 HOURS
Status: DISCONTINUED | OUTPATIENT
Start: 2023-11-21 | End: 2023-11-22

## 2023-11-21 RX ORDER — NALBUPHINE HYDROCHLORIDE 20 MG/ML
2.5-5 INJECTION, SOLUTION INTRAMUSCULAR; INTRAVENOUS; SUBCUTANEOUS EVERY 6 HOURS PRN
Status: DISCONTINUED | OUTPATIENT
Start: 2023-11-21 | End: 2023-11-23 | Stop reason: HOSPADM

## 2023-11-21 RX ORDER — LIDOCAINE HYDROCHLORIDE AND EPINEPHRINE 15; 5 MG/ML; UG/ML
3 INJECTION, SOLUTION EPIDURAL
Status: DISCONTINUED | OUTPATIENT
Start: 2023-11-21 | End: 2023-11-22 | Stop reason: HOSPADM

## 2023-11-21 RX ORDER — ROPIVACAINE HYDROCHLORIDE 2 MG/ML
INJECTION, SOLUTION EPIDURAL; INFILTRATION; PERINEURAL
Status: COMPLETED | OUTPATIENT
Start: 2023-11-21 | End: 2023-11-21

## 2023-11-21 RX ORDER — LIDOCAINE 40 MG/G
CREAM TOPICAL
Status: DISCONTINUED | OUTPATIENT
Start: 2023-11-21 | End: 2023-11-22 | Stop reason: HOSPADM

## 2023-11-21 RX ORDER — SODIUM CHLORIDE 9 MG/ML
INJECTION, SOLUTION INTRAVENOUS CONTINUOUS
Status: DISCONTINUED | OUTPATIENT
Start: 2023-11-21 | End: 2023-11-22 | Stop reason: HOSPADM

## 2023-11-21 RX ORDER — OXYTOCIN/0.9 % SODIUM CHLORIDE 30/500 ML
1-24 PLASTIC BAG, INJECTION (ML) INTRAVENOUS CONTINUOUS
Status: DISCONTINUED | OUTPATIENT
Start: 2023-11-21 | End: 2023-11-22 | Stop reason: HOSPADM

## 2023-11-21 RX ORDER — ONDANSETRON 4 MG/1
4 TABLET, ORALLY DISINTEGRATING ORAL EVERY 6 HOURS PRN
Status: DISCONTINUED | OUTPATIENT
Start: 2023-11-21 | End: 2023-11-22 | Stop reason: HOSPADM

## 2023-11-21 RX ORDER — SODIUM CHLORIDE, SODIUM LACTATE, POTASSIUM CHLORIDE, CALCIUM CHLORIDE 600; 310; 30; 20 MG/100ML; MG/100ML; MG/100ML; MG/100ML
INJECTION, SOLUTION INTRAVENOUS CONTINUOUS PRN
Status: DISCONTINUED | OUTPATIENT
Start: 2023-11-21 | End: 2023-11-22 | Stop reason: HOSPADM

## 2023-11-21 RX ORDER — ROPIVACAINE HYDROCHLORIDE 2 MG/ML
10 INJECTION, SOLUTION EPIDURAL; INFILTRATION; PERINEURAL ONCE
Status: DISCONTINUED | OUTPATIENT
Start: 2023-11-21 | End: 2023-11-22 | Stop reason: HOSPADM

## 2023-11-21 RX ORDER — ONDANSETRON 2 MG/ML
4 INJECTION INTRAMUSCULAR; INTRAVENOUS EVERY 6 HOURS PRN
Status: DISCONTINUED | OUTPATIENT
Start: 2023-11-21 | End: 2023-11-22 | Stop reason: HOSPADM

## 2023-11-21 RX ORDER — FENTANYL CITRATE 50 UG/ML
INJECTION, SOLUTION INTRAMUSCULAR; INTRAVENOUS
Status: COMPLETED | OUTPATIENT
Start: 2023-11-21 | End: 2023-11-21

## 2023-11-21 RX ORDER — EPHEDRINE SULFATE 50 MG/ML
5 INJECTION, SOLUTION INTRAMUSCULAR; INTRAVENOUS; SUBCUTANEOUS
Status: DISCONTINUED | OUTPATIENT
Start: 2023-11-21 | End: 2023-11-22 | Stop reason: HOSPADM

## 2023-11-21 RX ADMIN — MISOPROSTOL 800 MCG: 200 TABLET ORAL at 23:58

## 2023-11-21 RX ADMIN — PENICILLIN G POTASSIUM 5 MILLION UNITS: 5000000 POWDER, FOR SOLUTION INTRAMUSCULAR; INTRAPLEURAL; INTRATHECAL; INTRAVENOUS at 13:34

## 2023-11-21 RX ADMIN — Medication 340 ML/HR: at 23:42

## 2023-11-21 RX ADMIN — SODIUM CHLORIDE, POTASSIUM CHLORIDE, SODIUM LACTATE AND CALCIUM CHLORIDE 500 ML: 600; 310; 30; 20 INJECTION, SOLUTION INTRAVENOUS at 00:54

## 2023-11-21 RX ADMIN — MISOPROSTOL 25 MCG: 100 TABLET ORAL at 07:42

## 2023-11-21 RX ADMIN — PENICILLIN G 3 MILLION UNITS: 3000000 INJECTION, SOLUTION INTRAVENOUS at 18:08

## 2023-11-21 RX ADMIN — EPHEDRINE SULFATE 5 MG: 5 INJECTION INTRAVENOUS at 20:58

## 2023-11-21 RX ADMIN — Medication: at 20:09

## 2023-11-21 RX ADMIN — MISOPROSTOL 25 MCG: 100 TABLET ORAL at 03:35

## 2023-11-21 RX ADMIN — PENICILLIN G 3 MILLION UNITS: 3000000 INJECTION, SOLUTION INTRAVENOUS at 22:58

## 2023-11-21 RX ADMIN — SODIUM CHLORIDE, POTASSIUM CHLORIDE, SODIUM LACTATE AND CALCIUM CHLORIDE: 600; 310; 30; 20 INJECTION, SOLUTION INTRAVENOUS at 19:58

## 2023-11-21 RX ADMIN — TRANEXAMIC ACID 1 G: 10 INJECTION, SOLUTION INTRAVENOUS at 23:57

## 2023-11-21 RX ADMIN — MISOPROSTOL 25 MCG: 100 TABLET ORAL at 05:42

## 2023-11-21 RX ADMIN — SODIUM CHLORIDE 250 ML: 9 INJECTION, SOLUTION INTRAVENOUS at 15:30

## 2023-11-21 RX ADMIN — EPHEDRINE SULFATE 5 MG: 5 INJECTION INTRAVENOUS at 20:28

## 2023-11-21 RX ADMIN — Medication 2 MILLI-UNITS/MIN: at 10:01

## 2023-11-21 RX ADMIN — ROPIVACAINE HYDROCHLORIDE 8 ML: 2 INJECTION, SOLUTION EPIDURAL; INFILTRATION at 20:05

## 2023-11-21 RX ADMIN — FENTANYL CITRATE 50 MCG: 50 INJECTION, SOLUTION INTRAMUSCULAR; INTRAVENOUS at 19:08

## 2023-11-21 RX ADMIN — EPHEDRINE SULFATE 5 MG: 5 INJECTION INTRAVENOUS at 20:33

## 2023-11-21 RX ADMIN — SODIUM CHLORIDE, POTASSIUM CHLORIDE, SODIUM LACTATE AND CALCIUM CHLORIDE: 600; 310; 30; 20 INJECTION, SOLUTION INTRAVENOUS at 13:34

## 2023-11-21 RX ADMIN — ACETAMINOPHEN 650 MG: 325 TABLET, FILM COATED ORAL at 16:40

## 2023-11-21 RX ADMIN — SODIUM CHLORIDE, POTASSIUM CHLORIDE, SODIUM LACTATE AND CALCIUM CHLORIDE 250 ML: 600; 310; 30; 20 INJECTION, SOLUTION INTRAVENOUS at 21:06

## 2023-11-21 RX ADMIN — FENTANYL CITRATE 100 MCG: 50 INJECTION INTRAMUSCULAR; INTRAVENOUS at 20:05

## 2023-11-21 ASSESSMENT — ACTIVITIES OF DAILY LIVING (ADL)
ADLS_ACUITY_SCORE: 20

## 2023-11-21 NOTE — PROGRESS NOTES
Intrapartum Progress Note:    S: Andree Avila is doing well, feeling more uncomfortable with contractions. She has some mild URI symptoms. Her  has some URI symptoms.     O:  /66   Temp 100.3  F (37.9  C) (Temporal)   Resp 16   SpO2 99%   Breastfeeding No   General: no acute distress  SVE: 80/-1    FHT: 170s, Moderate BTBV, +accels, no decels  Gandy: q 1-2 minutes, IUPC in place    Assessment and Plan: Andree Rmaos is a 28 year old  at 37w1d here for cervical ripening and induction of labor due to gestational hypertension     IOL  - AROM clear fluid, IUPC in place  - Persistent fetal tachycardia, infectious workup in progress, no obvious source of infection. No fundal tenderness but mild URI symptoms (COVID/flu/RSV swab pending)  - FHT otherwise reactive and reassuring with persistent occurrence of accelerations and moderate variability. Discussed fetal tachycardia with patient, however given other reassuring signs will continue IOL for goal of vaginal delivery  - Will continue to monitor  - pain mgmt per pt request  - GBS positive, antibiotics ordered (pt received result from [facility], still not available to the office)  - anticipate vaginal delivery     GHTN  - PIH labs wnl on admission, P:C wnl  - repeat labs if severe range BP or pt develops sx.     Dispo: continue IOL    Elizabeth Walters MD  23 5:19 PM

## 2023-11-21 NOTE — PROGRESS NOTES
Intrapartum Progress Note:    S: Andree Avila is doing well, feeling some occasional cramping.    O;  /84 (BP Location: Right arm, Patient Position: Semi-Viera's, Cuff Size: Adult Regular)   Temp 97.8  F (36.6  C) (Temporal)   Resp 16   SpO2 99%   Breastfeeding No    SVE: 3/80/-2      Assessment and Plan: Andree Ramos is a 28 year old  at 37w1d here for cervical ripening and induction of labor due to gestational hypertension     IOL  - last dose of Po cytotec at 0730, plan for pitocin then AROM   - pain mgmt per pt request  - GBS positive, antibiotics ordered (pt received result from [facility], still not available to the office)  - anticipate vaginal delivery     GHTN  - PIH labs wnl on admission, P:C wnl  - repeat labs if severe range BP or pt develops sx.     Dispo: cont IOL     Elizabeth Walters MD  [facility] [place] JESUS Benitez PA  23 7:58 AM

## 2023-11-21 NOTE — PROVIDER NOTIFICATION
11/21/23 0129   Provider Notification   Provider Name/Title Dr. Barron   Method of Notification Electronic Page   Request Evaluate - Remote   Notification Reason Fetal Baseline Change;SVE     Dr Barron was paged due to fetal heart tone changes. Intermittent decelerations and elevation of fetal heart rate to the 160s. Reviewed RN interventions (see flowsheets) and fetal response. Plan of care is to hold 01:30am dose of misoprostol and monitor baby. If fetal heart tones return to baseline with accelerations can give 03:30am dose.

## 2023-11-21 NOTE — PROVIDER NOTIFICATION
11/21/23 1140   Provider Notification   Provider Name/Title Dr. Walters   Method of Notification Electronic Page   Request Evaluate - Remote   Notification Reason Fetal Baseline Change;Status Update     Update read as follows.     GBS +. FHT baseline change from 150 to 180bpm. moderate variability with accels and no decels. Pitocin at 4mu. Ctx 2-3. VSS afebrile. Anita [phone]    Return call  received. Plan to monitor until 1215. If fetal tachycardia still present, give 500mL bolus.     1215: 500mL bolus given over 31min. FHT baseline 180bpm with moderate variability and accelerations present and no decelerations. MD aware. Will cont to monitor.

## 2023-11-22 LAB
ALBUMIN SERPL BCG-MCNC: 2.9 G/DL (ref 3.5–5.2)
ALP SERPL-CCNC: 132 U/L (ref 40–150)
ALT SERPL W P-5'-P-CCNC: 13 U/L (ref 0–50)
ANION GAP SERPL CALCULATED.3IONS-SCNC: 17 MMOL/L (ref 7–15)
AST SERPL W P-5'-P-CCNC: 28 U/L (ref 0–45)
BILIRUB SERPL-MCNC: 0.2 MG/DL
BUN SERPL-MCNC: 7.7 MG/DL (ref 6–20)
CALCIUM SERPL-MCNC: 8.7 MG/DL (ref 8.6–10)
CHLORIDE SERPL-SCNC: 99 MMOL/L (ref 98–107)
CREAT SERPL-MCNC: 0.54 MG/DL (ref 0.51–0.95)
DEPRECATED HCO3 PLAS-SCNC: 14 MMOL/L (ref 22–29)
EGFRCR SERPLBLD CKD-EPI 2021: >90 ML/MIN/1.73M2
ERYTHROCYTE [DISTWIDTH] IN BLOOD BY AUTOMATED COUNT: 14.1 % (ref 10–15)
GLUCOSE SERPL-MCNC: 135 MG/DL (ref 70–99)
HCT VFR BLD AUTO: 29.7 % (ref 35–47)
HGB BLD-MCNC: 9.2 G/DL (ref 11.7–15.7)
MCH RBC QN AUTO: 25.1 PG (ref 26.5–33)
MCHC RBC AUTO-ENTMCNC: 31 G/DL (ref 31.5–36.5)
MCV RBC AUTO: 81 FL (ref 78–100)
PLATELET # BLD AUTO: 217 10E3/UL (ref 150–450)
POTASSIUM SERPL-SCNC: 4.6 MMOL/L (ref 3.4–5.3)
PROT SERPL-MCNC: 5.2 G/DL (ref 6.4–8.3)
RBC # BLD AUTO: 3.67 10E6/UL (ref 3.8–5.2)
SODIUM SERPL-SCNC: 130 MMOL/L (ref 135–145)
WBC # BLD AUTO: 15.8 10E3/UL (ref 4–11)

## 2023-11-22 PROCEDURE — 10D17ZZ EXTRACTION OF PRODUCTS OF CONCEPTION, RETAINED, VIA NATURAL OR ARTIFICIAL OPENING: ICD-10-PCS | Performed by: STUDENT IN AN ORGANIZED HEALTH CARE EDUCATION/TRAINING PROGRAM

## 2023-11-22 PROCEDURE — 0UC97ZZ EXTIRPATION OF MATTER FROM UTERUS, VIA NATURAL OR ARTIFICIAL OPENING: ICD-10-PCS | Performed by: STUDENT IN AN ORGANIZED HEALTH CARE EDUCATION/TRAINING PROGRAM

## 2023-11-22 PROCEDURE — 80053 COMPREHEN METABOLIC PANEL: CPT | Performed by: STUDENT IN AN ORGANIZED HEALTH CARE EDUCATION/TRAINING PROGRAM

## 2023-11-22 PROCEDURE — 250N000013 HC RX MED GY IP 250 OP 250 PS 637: Performed by: STUDENT IN AN ORGANIZED HEALTH CARE EDUCATION/TRAINING PROGRAM

## 2023-11-22 PROCEDURE — 120N000012 HC R&B POSTPARTUM

## 2023-11-22 PROCEDURE — 85027 COMPLETE CBC AUTOMATED: CPT | Performed by: STUDENT IN AN ORGANIZED HEALTH CARE EDUCATION/TRAINING PROGRAM

## 2023-11-22 PROCEDURE — 0KQM0ZZ REPAIR PERINEUM MUSCLE, OPEN APPROACH: ICD-10-PCS | Performed by: STUDENT IN AN ORGANIZED HEALTH CARE EDUCATION/TRAINING PROGRAM

## 2023-11-22 PROCEDURE — 93005 ELECTROCARDIOGRAM TRACING: CPT

## 2023-11-22 PROCEDURE — 36415 COLL VENOUS BLD VENIPUNCTURE: CPT | Performed by: STUDENT IN AN ORGANIZED HEALTH CARE EDUCATION/TRAINING PROGRAM

## 2023-11-22 PROCEDURE — 93010 ELECTROCARDIOGRAM REPORT: CPT | Performed by: INTERNAL MEDICINE

## 2023-11-22 RX ORDER — MISOPROSTOL 200 UG/1
400 TABLET ORAL
Status: DISCONTINUED | OUTPATIENT
Start: 2023-11-22 | End: 2023-11-23 | Stop reason: HOSPADM

## 2023-11-22 RX ORDER — BISACODYL 10 MG
10 SUPPOSITORY, RECTAL RECTAL DAILY PRN
Status: DISCONTINUED | OUTPATIENT
Start: 2023-11-22 | End: 2023-11-23 | Stop reason: HOSPADM

## 2023-11-22 RX ORDER — DOCUSATE SODIUM 100 MG/1
100 CAPSULE, LIQUID FILLED ORAL DAILY
Status: DISCONTINUED | OUTPATIENT
Start: 2023-11-22 | End: 2023-11-23 | Stop reason: HOSPADM

## 2023-11-22 RX ORDER — OXYTOCIN 10 [USP'U]/ML
10 INJECTION, SOLUTION INTRAMUSCULAR; INTRAVENOUS
Status: DISCONTINUED | OUTPATIENT
Start: 2023-11-22 | End: 2023-11-23 | Stop reason: HOSPADM

## 2023-11-22 RX ORDER — IBUPROFEN 400 MG/1
800 TABLET, FILM COATED ORAL EVERY 6 HOURS PRN
Status: DISCONTINUED | OUTPATIENT
Start: 2023-11-22 | End: 2023-11-23 | Stop reason: HOSPADM

## 2023-11-22 RX ORDER — TRANEXAMIC ACID 10 MG/ML
1 INJECTION, SOLUTION INTRAVENOUS EVERY 30 MIN PRN
Status: DISCONTINUED | OUTPATIENT
Start: 2023-11-22 | End: 2023-11-23 | Stop reason: HOSPADM

## 2023-11-22 RX ORDER — CARBOPROST TROMETHAMINE 250 UG/ML
250 INJECTION, SOLUTION INTRAMUSCULAR
Status: DISCONTINUED | OUTPATIENT
Start: 2023-11-22 | End: 2023-11-23 | Stop reason: HOSPADM

## 2023-11-22 RX ORDER — MODIFIED LANOLIN
OINTMENT (GRAM) TOPICAL
Status: DISCONTINUED | OUTPATIENT
Start: 2023-11-22 | End: 2023-11-23 | Stop reason: HOSPADM

## 2023-11-22 RX ORDER — ACETAMINOPHEN 325 MG/1
650 TABLET ORAL EVERY 4 HOURS PRN
Status: DISCONTINUED | OUTPATIENT
Start: 2023-11-22 | End: 2023-11-23 | Stop reason: HOSPADM

## 2023-11-22 RX ORDER — OXYTOCIN/0.9 % SODIUM CHLORIDE 30/500 ML
340 PLASTIC BAG, INJECTION (ML) INTRAVENOUS CONTINUOUS PRN
Status: DISCONTINUED | OUTPATIENT
Start: 2023-11-22 | End: 2023-11-23 | Stop reason: HOSPADM

## 2023-11-22 RX ORDER — METHYLERGONOVINE MALEATE 0.2 MG/ML
200 INJECTION INTRAVENOUS
Status: DISCONTINUED | OUTPATIENT
Start: 2023-11-22 | End: 2023-11-23 | Stop reason: HOSPADM

## 2023-11-22 RX ORDER — HYDROCORTISONE 25 MG/G
CREAM TOPICAL 3 TIMES DAILY PRN
Status: DISCONTINUED | OUTPATIENT
Start: 2023-11-22 | End: 2023-11-23 | Stop reason: HOSPADM

## 2023-11-22 RX ORDER — MISOPROSTOL 200 UG/1
800 TABLET ORAL
Status: DISCONTINUED | OUTPATIENT
Start: 2023-11-22 | End: 2023-11-23 | Stop reason: HOSPADM

## 2023-11-22 RX ADMIN — IBUPROFEN 800 MG: 400 TABLET ORAL at 08:32

## 2023-11-22 RX ADMIN — ACETAMINOPHEN 650 MG: 325 TABLET, FILM COATED ORAL at 20:20

## 2023-11-22 RX ADMIN — ACETAMINOPHEN 650 MG: 325 TABLET, FILM COATED ORAL at 14:09

## 2023-11-22 RX ADMIN — DOCUSATE SODIUM 100 MG: 100 CAPSULE, LIQUID FILLED ORAL at 08:32

## 2023-11-22 RX ADMIN — IBUPROFEN 800 MG: 400 TABLET ORAL at 02:37

## 2023-11-22 RX ADMIN — ACETAMINOPHEN 650 MG: 325 TABLET, FILM COATED ORAL at 08:31

## 2023-11-22 RX ADMIN — IBUPROFEN 800 MG: 400 TABLET ORAL at 20:19

## 2023-11-22 RX ADMIN — IBUPROFEN 800 MG: 400 TABLET ORAL at 14:10

## 2023-11-22 RX ADMIN — ACETAMINOPHEN 650 MG: 325 TABLET, FILM COATED ORAL at 02:36

## 2023-11-22 ASSESSMENT — ACTIVITIES OF DAILY LIVING (ADL)
ADLS_ACUITY_SCORE: 20
ADLS_ACUITY_SCORE: 21
ADLS_ACUITY_SCORE: 20
ADLS_ACUITY_SCORE: 21
ADLS_ACUITY_SCORE: 20
ADLS_ACUITY_SCORE: 20

## 2023-11-22 NOTE — PLAN OF CARE
Goal Outcome Evaluation:  Patient got taken off tele and moved to the PP unit. She is up and independent in the room. She states she is feeling fine. She is wanting to be discharged tomorrow morning. She is not having symptoms from her covid.

## 2023-11-22 NOTE — PROVIDER NOTIFICATION
11/21/23 1934   Provider Notification   Provider Name/Title AMBROSE Hoff   Method of Notification Electronic Page   Request Evaluate in Person   Notification Reason Pain     MDA paged for patient requesting epidural. Reviewed patient labs and status. MDA placing orders and then coming to unit.

## 2023-11-22 NOTE — ANESTHESIA PREPROCEDURE EVALUATION
Anesthesia Pre-Procedure Evaluation    Patient: Andree Ramos   MRN: [id number] : 1995        Procedure :           Past Medical History:   Diagnosis Date    Hypertension       History reviewed. No pertinent surgical history.   Allergies   Allergen Reactions    Codeine Sulfate      anaphyllaxis      Social History     Tobacco Use    Smoking status: Former     Packs/day: .8     Types: Cigarettes     Quit date: 2023     Years since quittin.6    Smokeless tobacco: Never    Tobacco comments:     .5-1 ppd   Substance Use Topics    Alcohol use: Never      Wt Readings from Last 1 Encounters:   10/04/20 53.5 kg (118 lb)        Anesthesia Evaluation            ROS/MED HX  ENT/Pulmonary:    (-) asthma   Neurologic:  - neg neurologic ROS     Cardiovascular:     (+)  hypertension- - PIH  -  - -                                      METS/Exercise Tolerance:     Hematologic:     (+)  no anticoagulation therapy, no coagulopathy,             Musculoskeletal:       GI/Hepatic:     (+) GERD,                   Renal/Genitourinary:       Endo:       Psychiatric/Substance Use:       Infectious Disease: Comment: COVID +      Malignancy:       Other:            Physical Exam    Airway        Mallampati: II   TM distance: > 3 FB   Neck ROM: full     Respiratory Devices and Support         Dental  no notable dental history         Cardiovascular   cardiovascular exam normal          Pulmonary   pulmonary exam normal                OUTSIDE LABS:  CBC:   Lab Results   Component Value Date    WBC 8.7 2023    WBC 9.8 2023    HGB 9.1 (L) 2023    HGB 10.7 (L) 2023    HCT 28.4 (L) 2023    HCT 33.5 (L) 2023     2023     2023     BMP:   Lab Results   Component Value Date     2023    POTASSIUM 4.0 2023    CHLORIDE 104 2023    CHLORIDE 103 2023    CO2 18 (L) 2023    BUN 6.0 2023    CR 0.40 (L) 2023     (H) 2023  "    COAGS: No results found for: \"PTT\", \"INR\", \"FIBR\"  POC: No results found for: \"BGM\", \"HCG\", \"HCGS\"  HEPATIC:   Lab Results   Component Value Date    ALBUMIN 3.5 11/20/2023    PROTTOTAL 6.4 11/20/2023    ALT 10 11/20/2023    AST 13 11/20/2023    ALKPHOS 156 (H) 11/20/2023    BILITOTAL 0.2 11/20/2023     OTHER:   Lab Results   Component Value Date    RACHEL 8.9 11/20/2023       Anesthesia Plan    ASA Status:  3       Anesthesia Type: Epidural.              Consents    Anesthesia Plan(s) and associated risks, benefits, and realistic alternatives discussed. Questions answered and patient/representative(s) expressed understanding.     - Discussed:     - Discussed with:  Patient            Postoperative Care            Comments:    Other Comments: Orders to manage the epidural infusion have been entered, and through coordination with the nurse, we will continute to manage and monitor the patient's labor epidural.  We will continuously be available to adjust as needed thruout the entire L&D process.             Josh Jacobs, DO, DO  "

## 2023-11-22 NOTE — PROVIDER NOTIFICATION
11/21/23 2026   Provider Notification   Provider Name/Title Dr. Ziegler   Method of Notification Phone;At Bedside   Request Evaluate in Person   Notification Reason Decels     In house OB called into room due to prolonged deceleration. Currently at 5 minutes. Update given as to epidural, decrease in Bps, repositioning, SVE, and ephedrine given. MD assisted in placing fetal scalp electrode. Fetal heart tones back within normal range at 8 minutes.

## 2023-11-22 NOTE — PROGRESS NOTES
Viable male infant born via vaginal delivery. PPH with EBL of 590cc, patient given  rectal cyctotec, TXA, and pitocin. BP is stable but heart rate steady in 150s-160s. EKG showed sinus tachycardia. Patient to stay on labor and delivery with tele monitoring over night.

## 2023-11-22 NOTE — ANESTHESIA POSTPROCEDURE EVALUATION
Patient: Andree Ramos    Procedure: * No procedures listed *       Anesthesia Type:  Epidural    Note:  Disposition: Outpatient   Postop Pain Control: Uneventful            Sign Out: Well controlled pain   PONV: No   Neuro/Psych: Uneventful            Sign Out: Acceptable/Baseline neuro status   Airway/Respiratory: Uneventful            Sign Out: Acceptable/Baseline resp. status   CV/Hemodynamics: Uneventful            Sign Out: Acceptable CV status   Other NRE: NONE   DID A NON-ROUTINE EVENT OCCUR? No    Event details/Postop Comments:  Patient unavailable at time of epidural follow-up rounding.  Per chart review, patient doing well, ambulating and without anesthesia/epidural related concerns.           Last vitals:  Vitals:    11/22/23 0813 11/22/23 1202 11/22/23 1500   BP: 110/64 118/56 123/78   Pulse:   106   Resp: 16 16 16   Temp: 36.7  C (98.1  F) 36.4  C (97.6  F) 36.8  C (98.2  F)   SpO2:          Electronically Signed By: Santo Cardoza MD  November 22, 2023  5:48 PM

## 2023-11-22 NOTE — PROVIDER NOTIFICATION
11/21/23 1473   Provider Notification   Provider Name/Title Dr. Walters   Method of Notification At Bedside   Notification Reason Decels     OB at bedside due to recurrent late decelerations. SVE performed. Patient complete. Plan to labor down and allow baby to recover.

## 2023-11-22 NOTE — PROGRESS NOTES
Intrapartum progress note:    S: Andree Avila is comfortable with epidural in place. No longer feeling contractions or low back pain. Continues to denies HA, vision changes, CP, SOB, RUQ pain.    O:  /50   Temp 98.2  F (36.8  C) (Temporal)   Resp 18   SpO2 95%   Breastfeeding No    SVE: C/C/0    FHT: 155, moderate BTBV, no accels  Prolonged FHT deceleration following epidural associated with maternal hypotension, a total of three doses of ephedrine were given and maternal BP improved and FHT back to 150s without FHT decelerations. Cervix was lip during decel, FSE placed. Rechecked now and complete however FHT decelerations currently resolved so plan to labor down for 20-30 minutes then start pushing.    A/P:Andree Ramos is a 28 year old  at 37w1d here for cervical ripening and induction of labor due to gestational hypertension     IOL  - AROM clear fluid, IUPC in place  - Persistent fetal tachycardia, COVID + which explains both maternal and fetal tachycardia  - Will continue to monitor  - pain mgmt per pt request  - GBS positive, antibiotics ordered (pt received result from [facility], still not available to the office)     GHTN  - PIH labs wnl on admission, P:C wnl  - repeat labs if severe range BP or pt develops sx.    COVID+, pt aware     Dispo: continue IOL    Elizabeth Walters MD   OBGYN, [facility]  23 9:24 PM

## 2023-11-22 NOTE — ANESTHESIA PROCEDURE NOTES
"Epidural catheter Procedure Note    Pre-Procedure   Staff -        Anesthesiologist:  Josh Jacobs DO       Performed By: anesthesiologist       Location: OB       Pre-Anesthestic Checklist: patient identified, IV checked, risks and benefits discussed, informed consent, monitors and equipment checked, pre-op evaluation and at physician/surgeon's request  Timeout:       Correct Patient: Yes        Correct Procedure: Yes        Correct Site: Yes        Correct Position: Yes   Procedure Documentation  Procedure: epidural catheter       Patient Position: sitting       Patient Prep/Sterile Barriers: sterile gloves, mask, patient draped       Skin prep: Betadine       Local skin infiltrated with 3 mL of 1% lidocaine.        Insertion Site: L4-5. (midline approach).       Technique: LORT saline        GIULIA at 6 cm.       Needle Type: [employer] needle       Needle Gauge: 17.        Needle Length (Inches): 3.5        Catheter: 19 G.          Catheter threaded easily.         4 cm epidural space.         Threaded 10 cm at skin.         # of attempts: 1 and  # of redirects:  1    Assessment/Narrative         Paresthesias: No.       Test dose of 5 mL lidocaine 1.5% w/ 1:200,000 epinephrine at.         Test dose negative, 3 minutes after injection, for signs of intravascular, subdural, or intrathecal injection.       Insertion/Infusion Method: LORT saline       Aspiration negative for Heme or CSF via Epidural Catheter.    Medication(s) Administered   0.2% Ropivacaine (Epidural) - EPIDURAL   8 mL - 11/21/2023 8:05:00 PM  Fentanyl PF (Epidural) - EPIDURAL   100 mcg - 11/21/2023 8:05:00 PM    FOR [facility] ([facility]) ONLY:   Pain Team Contact information: please page the Pain Team Via Media[employer]. Search \"Pain\". During daytime hours, please page the attending first. At night please page the resident first.      "

## 2023-11-22 NOTE — PROGRESS NOTES
Andree Ramos  2023   PPD1 s/p     S: 28 year old  delivered at 37w1d   Doing well without complaints.  Sore but medication helping.   Lochia moderate.   Ambulating.  Urinating without issues.  Breastfeeding.  Feels fine from the COVID standpoint. No sensation of her heart racing, CP, SOB.     O: /56 (BP Location: Left arm, Patient Position: Semi-Viera's)   Temp 97.6  F (36.4  C) (Oral)   Resp 16   SpO2 99%   Breastfeeding Unknown   Gen: NAD  Abd: soft, NT, ND, FF below U  Ext: NT, nonedematous    A/P:  28 year old  PPD1 s/p  after IOL for GHTN    GHTN  - normotensive  - no concerning sx  - normal labs  - BP follow-up in the office within a week of discharge    COVID  - diagnosed during labor  - feels well  - tachycardia to 150s - now overall improved, more tachycardic when up and moving, no events on tele, asymptomatic from this   - declines paxlovid    Postpartum  - ibuprofen and acetaminophen PRN pain  - support breastfeeding  - monitor lochia  - encourage ambulation  - regular diet  - routine PP care  - anticipate discharge to home tomorrow    Shanna Barron MD  Text Page (8am - 5pm)  2023 0072

## 2023-11-22 NOTE — PLAN OF CARE
Goal Outcome Evaluation:      Plan of Care Reviewed With: patient, spouse    Overall Patient Progress: improvingOverall Patient Progress: improving     Vital signs stable. Heart rate slightly elevated at 106, other wise patient is asymptomatic for covid. Patient voiding without difficulty. Able to ambulate in room free of dizziness. Taking tylenol/ibuprofen for pain management. Working on breastfeeding infant every 2-3 hours. Using lanolin for sore nipples. Encouraged to call with latch assist. Questions/Concerns addressed.

## 2023-11-22 NOTE — PROGRESS NOTES
Asked by Dr. Walters to attend the delivery of this term, male infant with a gestational age of 37 1/7 weeks secondary to decels during labor. Pregnancy complicated by maternal COVID-19 infection.     The infant was vigorous upon delivery. 60 seconds of delayed cord clamping were completed.  The infant was stimulated on mother's abdomen, cried and had spontaneous respirations during delayed cord clamping. Becoming pink in room air. The infant remained by mother and NICU team was dismissed.  Infant was left in the care of L&D nursing staff who will assign Apgar scores and provide normal  cares.    SUKHJINDER Seo CNP on 2023 at 11:40 PM

## 2023-11-22 NOTE — L&D DELIVERY NOTE
"Vaginal Delivery Procedure Note     Delivery provider: Elizabeth Walters MD; [facility]     Delivery date/time: 2023     Preop Dx:   IUP at 37w2d  Gestational hypertension  COVID+, diagnosed during labor    Postop Dx:   Same as above     Procedure: Spontaneous Vaginal Delivery     Anesthesia:  Epidural    Delivery QBL (mL): 590     Specimens: cord blood and cord gases     Findings: VMI \"Mccullough\", OA presentation, 3VC, no nuchal cord, Apgars 8/9, second degree perineal laceration    Labor Course: Andree Ramos is a 28 year old  at 37w1d who presented to L&D for induction of labor due to gestational hypertension. Received several doses of PO cytotec, transitioned to pitocin and continued to progress in labor to complete. Her labor course was complicated by fetal tachycardia, no evidence of intrauterine infection however COVID test positive.     Fetal tracing 2 throughout labor and 2 during pushing efforts to delivery     Delivery details:  At bedside. Patient complete and pushing. With good maternal expulsive efforts, the infant's head delivered. Examined for nuchal cord and none noted. Shoulders followed without force or delay. Delivered VMI to mother's abdomen. Cord clamped and cut after 60 seconds; 3VC. Cord gases and cord blood collected and sent.  Placenta delivered via gentle traction and fundal massage. Uterus explored and cleared of all clot and debris. Uterine atony immediately after delivery. Given bolus of pitocin, TXA 1g IV, and cytotec 800mcg PV and tone improved. Perineum examined and 2nd degree laceration noted and repaired in standard fashion. Remainder of exam showed no other vaginal or cervical lacerations.  cc. Apgars 8/9 at 1 and 5 minutes respectively. Vaginal counts correct. Fundus firm at U-1 with scant/normal flow after.    Sponge and needle count were correct.      stable with mother admitted to [facility]. Mother stable in delivery room.     After delivery sustained " maternal tachycardia. Previous maternal tachycardia to 110-120s now 140-150s. EKG c/w sinus tachycardia. STAT CBC and CMP ordered and overall normal. Pt continues to be afebrile, BP wnl, O2 sat 98%. Pt asymptomatic. Telemetry ordered over night due to persistent tachycardia.    Elizabeth Walters MD

## 2023-11-23 VITALS
HEART RATE: 108 BPM | SYSTOLIC BLOOD PRESSURE: 112 MMHG | OXYGEN SATURATION: 99 % | TEMPERATURE: 97.5 F | RESPIRATION RATE: 16 BRPM | WEIGHT: 186.3 LBS | BODY MASS INDEX: 31.24 KG/M2 | DIASTOLIC BLOOD PRESSURE: 65 MMHG

## 2023-11-23 PROCEDURE — 250N000013 HC RX MED GY IP 250 OP 250 PS 637: Performed by: STUDENT IN AN ORGANIZED HEALTH CARE EDUCATION/TRAINING PROGRAM

## 2023-11-23 RX ADMIN — ACETAMINOPHEN 650 MG: 325 TABLET, FILM COATED ORAL at 03:37

## 2023-11-23 RX ADMIN — DOCUSATE SODIUM 100 MG: 100 CAPSULE, LIQUID FILLED ORAL at 09:52

## 2023-11-23 RX ADMIN — IBUPROFEN 800 MG: 400 TABLET ORAL at 09:52

## 2023-11-23 RX ADMIN — IBUPROFEN 800 MG: 400 TABLET ORAL at 03:37

## 2023-11-23 RX ADMIN — ACETAMINOPHEN 650 MG: 325 TABLET, FILM COATED ORAL at 09:52

## 2023-11-23 ASSESSMENT — ACTIVITIES OF DAILY LIVING (ADL)
ADLS_ACUITY_SCORE: 20

## 2023-11-23 NOTE — PLAN OF CARE
Goal Outcome Evaluation:      Plan of Care Reviewed With: patient, spouse    Overall Patient Progress: improvingOverall Patient Progress: improving     Pt states that pain is being well managed. Pt is up and moving independently, voiding well. Vitally stable. Fundus is firm with minimal lochia present. Plan of care continues.

## 2023-11-23 NOTE — LACTATION NOTE
"Lactation visit with Andree, FOB, and baby Jamel,    Jamel has improved with feedings overnight but still sleepy with others. Supplementing with DBM (will switch to formula for home) finger feeding. Andree also pumping.    Family discharging home today, so talked through feeding plan going forward. Continue to work with infant on breastfeeding (nipple shield PRN), and Andree recommended to pump as long as infant being supplemented. Demonstrated paced bottle feeding for supplementation method at home.       Discussed physiology of milk production from colostrum through milk \"coming in\" between day 3-5 (typically). Answered questions regarding \"how to know when infant is done at the breast\". Educated to infant satiety signs; encouraged listening for audible swallows along with watching for changes in infant's stool color. Discussed normal infant weight loss and when infant should be back to birth weight. Stressed the importance of continuing to track infant's feeds and void/stools patterns, at least until infant has returned to his birth weight.    Answered general pumping questions, finding correct flange size, etc. Andree has a new breast pump for home use.     Feeding plan recommendations: provide unlimited, on-demand breast feedings: At least 8-12 times/24 hours (reviewed early feeding cues). Suggested pumping if baby has a poor feeding or if supplementation is necessary. Encouraged on-going use of a feeding log or jose daniel to record feedings along with void/stool patterns. Avoid pacifiers (until 1 month of age per AAP guidelines) and supplementation with formula unless medically indicated. Follow up with Pediatrician as requested and encouraged lactation follow up. Reviewed [facility] outpatient lactation resources. Appreciative of visit.    Nataly Szymanski RN, IBCLC          "

## 2023-11-23 NOTE — DISCHARGE INSTRUCTIONS
Warning Signs after Having a Baby    Keep this paper on your fridge or somewhere else where you can see it.    Call your provider if you have any of these symptoms up to 12 weeks after having your baby.    Thoughts of hurting yourself or your baby  Pain in your chest or trouble breathing  Severe headache not helped by pain medicine  Eyesight concerns (blurry vision, seeing spots or flashes of light, other changes to eyesight)  Fainting, shaking or other signs of a seizure    Call 9-1-1 if you feel that it is an emergency.     The symptoms below can happen to anyone after giving birth. They can be very serious. Call your provider if you have any of these warning signs.    My provider s phone number: [phone]    Losing too much blood (hemorrhage)    Call your provider if you soak through a pad in less than an hour or pass blood clots bigger than a golf ball. These may be signs that you are bleeding too much.    Blood clots in the legs or lungs    After you give birth, your body naturally clots its blood to help prevent blood loss. Sometimes this increased clotting can happen in other areas of the body, like the legs or lungs. This can block your blood flow and be very dangerous.     Call your provider if you:  Have a red, swollen spot on the back of your leg that is warm or painful when you touch it.   Are coughing up blood.     Infection    Call your provider if you have any of these symptoms:  Fever of 100.4 F (38 C) or higher.  Pain or redness around your stitches if you had an incision.   Any yellow, white, or green fluid coming from places where you had stitches or surgery.    Mood Problems (postpartum depression)    Many people feel sad or have mood changes after having a baby. But for some people, these mood swings are worse.     Call your provider right away if you feel so anxious or nervous that you can't care for yourself or your baby.    Preeclampsia (high blood pressure)    Even if you didn't have high  blood pressure when you were pregnant, you are at risk for the high blood pressure disease called preeclampsia. This risk can last up to 12 weeks after giving birth.     Call your provider if you have:   Pain on your right side under your rib cage  Sudden swelling in the hands and face    Remember: You know your body. If something doesn't feel right, get medical help.     For informational purposes only. Not to replace the advice of your health care provider. Copyright 2020 [facility]. All rights reserved. Clinically reviewed by Cristina Singh RNC-OB, MSN. Yoostay [id number] - Rev 02/23.

## 2023-11-23 NOTE — LACTATION NOTE
"Lactation visit with Andree, FOPROSPER, and baby Jamel,    Infant has been pretty sleepy with breastfeeding and primary RN shares her concern with infant's suck.  Primary RN assesses infant to hold his tongue to the roof of his mouth.  AGAPITO suggested offering a nipple shield and will help with the next feeding.     offers to help with a feeding around . We talk through placing the nipple shield. Andree shares she has felt most comfortable with football hold. HElped with positioning infant on L breast in football hold. Work on latching infant over the nipple shield. Infant could not be stimulated to suckle. Infant is now almost 24 hours old. AGAPITO asked about introducing supplement to entice infant to suckle. Parents very willing as they share they are concerned infant has \"eaten much\". Parents elect to supplement with DBM.      helps with trying to supplement at the breast with tube and syringe, but infant would not swallow. So AGAPITO then attempted finger feeding infant. Infant noted to have a very high arched palette. Infant does grasp onto 's gloved finger and suckles 5ml of DBM with finger feed independently.  then moves infant back to breast where infant now latches and suckles over the nipple shield for a couple of minutes (without supplement).    Feeding plan suggested moving forward:  1) Bring infant to breast per his hunger cues and at least every 3 hours.  2) If infant not wakeful to breastfeed; dad can finger feed supplement of DBM while Andree pumps.    3) Discussed if infant becomes more wakeful and active at the breast, Andree does NOT need to pump and supplementation is not necessary.    AGAPITO set-up Medela breast pump, fit Andree to a 24 mm flange, and educated to Initiate Mode with the pump.      Discussed  breastfeeding basics:   1. Watch for early feeding cues (licking lips, stirring or rooting, sucking movement with mouth, hands to mouth).  2. Infant should breastfeed on demand and a minimum of 8 " "times in 24 hours. Encourage/offer to breastfeed infant at least 3 hours (from the start of the last feeding). Encouraged to utilize RN support with breastfeeding.      Educated on techniques to wake a sleepy baby for feedings: un-swaddle infant, check infant's diaper, begin snuggling skin to skin and begin gentle stimulation including stroking infant's back and feet.     Reviewed breast feeding section in our \"Guide to Postpartum and  Care.\" Highlighting pages that educates to  feeding patterns/behavior: Emphasizing on day 1 infant may be more sleepy (the birthday nap); followed by a cluster-feeding (breastfeeding marathon) pattern on second day/night.     Appreciative of visit.    Nataly Szymanski RN, IBCLC          "

## 2023-11-23 NOTE — PROGRESS NOTES
Andree Ramos  2023   PPD2 s/p     S: 28 year old  delivered at 37w1d   Doing well without complaints.  Sore but medication helping.   Lochia moderate.   Ambulating.  Urinating without issues.  Breastfeeding.  Feels fine from the COVID standpoint. No sensation of her heart racing, CP, SOB. Heart rate normalized    O: /65 (BP Location: Left arm)   Pulse 108   Temp 97.5  F (36.4  C) (Oral)   Resp 16   SpO2 99%   Breastfeeding Unknown   Gen: NAD  Abd: soft, NT, ND, FF below U  Ext: NT, nonedematous    A/P:  28 year old  PPD2 s/p  after IOL for GHTN    GHTN  - normotensive  - no concerning sx  - normal labs  - BP follow-up in the office within a week of discharge    COVID  - diagnosed during labor  - feels well  - tachycardia to 150s - now overall improved, more tachycardic when up and moving, no events on tele, asymptomatic from this   - declines paxlovid    Postpartum  - ibuprofen and acetaminophen PRN pain  - support breastfeeding  - monitor lochia  - encourage ambulation  - regular diet  - routine PP care    Dispo: stable for discharge to home, f/u within 1 week for BP check then 6wk PPV    Elizabeth Walters MD   23 7:57 AM    Try tylenol 1000 bid for neck

## 2023-11-23 NOTE — PLAN OF CARE
Goal Outcome Evaluation:      Plan of Care Reviewed With: patient, spouse    Overall Patient Progress: improvingOverall Patient Progress: improving     Vital signs stable. Patient denies covid symptoms. Patient voiding without difficulty. Able to ambulate in room free of dizziness. Taking tylenol/ibuprofen for pain management. Working on breastfeeding infant every 2-3 hours. Pumping. Planning on discharge home today. Discharge instructions/follow up discussed. Questions/concerns addressed.

## 2023-11-24 LAB
ATRIAL RATE - MUSE: 148 BPM
DIASTOLIC BLOOD PRESSURE - MUSE: NORMAL MMHG
INTERPRETATION ECG - MUSE: NORMAL
P AXIS - MUSE: 57 DEGREES
PR INTERVAL - MUSE: 128 MS
QRS DURATION - MUSE: 64 MS
QT - MUSE: 264 MS
QTC - MUSE: 414 MS
R AXIS - MUSE: 81 DEGREES
SYSTOLIC BLOOD PRESSURE - MUSE: NORMAL MMHG
T AXIS - MUSE: 13 DEGREES
VENTRICULAR RATE- MUSE: 148 BPM

## 2024-06-23 ENCOUNTER — HEALTH MAINTENANCE LETTER (OUTPATIENT)
Age: 29
End: 2024-06-23

## 2025-07-12 ENCOUNTER — HEALTH MAINTENANCE LETTER (OUTPATIENT)
Age: 30
End: 2025-07-12